# Patient Record
Sex: MALE | Race: WHITE | NOT HISPANIC OR LATINO | Employment: OTHER | URBAN - METROPOLITAN AREA
[De-identification: names, ages, dates, MRNs, and addresses within clinical notes are randomized per-mention and may not be internally consistent; named-entity substitution may affect disease eponyms.]

---

## 2021-04-27 NOTE — PATIENT DISCUSSION
Corneal Foreign Body: Discussed with patient there may some discomfort including pain, redness, light sensitivity, tearing and blurry vision. The foreign body was removed today in office using a TB syringe and patricia. The patient was instructed not to rub the eye. Instructed patient to continue use of Erythromycin ointment prescribed by [facility] last night twice daily for 1 wk . Artificial tears may be used in between doses of ointment .

## 2021-04-27 NOTE — PATIENT DISCUSSION
New Prescription: ofloxacin (ofloxacin): drops: 0.3% 1 drop four times a day as directed into right eye 04-

## 2021-04-27 NOTE — PATIENT DISCUSSION
Diagnosis:Corneal foreign body, right eye, initial encounter Detail Level: Generalized Detail Level: Detailed

## 2022-03-11 ENCOUNTER — EMERGENCY VISIT (OUTPATIENT)
Dept: URBAN - METROPOLITAN AREA CLINIC 38 | Facility: CLINIC | Age: 82
End: 2022-03-11

## 2022-03-11 DIAGNOSIS — H04.123: ICD-10-CM

## 2022-03-11 DIAGNOSIS — H10.9: ICD-10-CM

## 2022-03-11 PROCEDURE — 92012 INTRM OPH EXAM EST PATIENT: CPT

## 2022-03-11 RX ORDER — ERYTHROMYCIN 5 MG/G: OINTMENT OPHTHALMIC EVERY EVENING

## 2022-03-11 RX ORDER — TOBRAMYCIN AND DEXAMETHASONE 1; 3 MG/ML; MG/ML: 1 SUSPENSION/ DROPS OPHTHALMIC

## 2022-03-11 ASSESSMENT — TONOMETRY
OS_IOP_MMHG: 7
OD_IOP_MMHG: 16

## 2022-08-05 ENCOUNTER — EMERGENCY VISIT (OUTPATIENT)
Dept: URBAN - METROPOLITAN AREA CLINIC 37 | Facility: CLINIC | Age: 82
End: 2022-08-05

## 2022-08-05 DIAGNOSIS — H04.123: ICD-10-CM

## 2022-08-05 PROCEDURE — 99213 OFFICE O/P EST LOW 20 MIN: CPT

## 2022-08-05 ASSESSMENT — TONOMETRY
OS_IOP_MMHG: 16
OD_IOP_MMHG: 12